# Patient Record
(demographics unavailable — no encounter records)

---

## 2024-10-14 NOTE — HISTORY OF PRESENT ILLNESS
[10] : 10 [8] : 8 [Sharp] : sharp [Frequent] : frequent [de-identified] : WC DOI 7/29/24.    . 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    10/2/24- here to follow up on neck/back pain only mildy improved with PT   9/4/24- MRI f/u.  Patient is a 56 year old male who presents today for evaluation of his neck/lower back. Pt states he fell and a bunch of boxed fell on top of him 07/29/24. Went to [facility], had XR taken which showed no fx. No previous injury. Went to  which gave him MDP and Flexeril  then sente for follow-up.  Has been OOW since injury. Has mayito taking muscle relaxants. MDP gave mild relief.     occupation -   hx of blood clots, saw hematologist without any underlying conditions , on Eliquis   hx of DM, metformin  [] : no [de-identified] : physical therapy

## 2024-10-14 NOTE — ASSESSMENT
[FreeTextEntry1] : 56 M with neck pain Left shoulde rpain and upper arm pain as well as lBP and spasm  We will also provide a prescription for anti-inflammatories.  Discussed major side effects of medication including but not limited to gastritis and acute kidney injury.  He was instructed to take with food and to discontinue use if stomach or esophageal pain developed. PT FOr C and L spine  OOW Pain managment referral for TITUS

## 2024-10-14 NOTE — DISCUSSION/SUMMARY
[Surgical risks reviewed] : Surgical risks reviewed [de-identified] : Pt present with wife as   Discussed surgical management options, Plan for left shoulder arthroscopy, rotator cuff repair, SAD, GHD, indicated procedures  Discussed rba, rehab, recovery Discussed risk related to surgery Pt wishes to proceed  ----------------------------------------------------------------------------  In light of the patient's medical co-morbidities we specifically discussed the negative impact of said conditions on wound healing, including wound dehiscence, wound drainage superficial and deep infection, and failure of surgical implants, biologic union, and negative patient outcomes. -----------------------------  The patient was advised of the diagnosis.  The natural history of the pathology was explained in full to the patient. All questions were answered.  The risks and benefits of surgical and non-surgical treatment were explained in full to the patient.  The patient demonstrated a full understanding of the surgical and non-surgical options.  The risks of surgery were outlined in full to the patient including but not limited to pain, stiffness, bleeding, scarring, infection, neurologic injury, vascular injury, failure to resolve symptoms, symptom recurrence, the need for further surgery, non-healing, implant failure, intraoperative fracture, wound breakdown, deep vein thrombosis, pulmonary embolism, anesthesia complications and even death.  The patient understood all the risks and accepted them and understood that other complications could occur that are not mentioned above.  The intraoperative plan, post-operative plan, post-operative expectations and limitations were explained in full.  Importance of postoperative rehabilitation and restriction compliance was explained and emphasized. Expectations from non-surgical treatment were explained in full as well.  The patient demonstrated a complete understanding of the treatment detailed, the risks and alternatives.  ----------------------------------------------------------------------------   All relevant imaging studies pertinent to today's visit, including x-rays, MRI's and/or other advanced imaging studies (CT/etc) were independently interpreted and reviewed with the patient as needed. Implications of the studies together with the patient's clinical picture were discussed to formulate a working diagnosis and management options were detailed.   The patient and/or guardian was advised of the diagnosis.  The natural history of the pathology was explained in full. All questions were answered.  The risks and benefits of conservative and interventional treatment alternatives were explained to the patient  The patient and/or guardian was advised if any advanced diagnostic/imaging study (MRI/CT/etc) is ordered to evaluate potential pathology in the affected area(s), they should follow up in the office to review the results of the study and determine further management that may be indicated.

## 2024-10-14 NOTE — IMAGING
[No instability seen on flexion/extension] : No instability seen on flexion/extension [No bony abnormalities] : No bony abnormalities [Disc space narrowing] : Disc space narrowing [de-identified] : Spine: Inspection/Palpation: No tenderness to palpation throughout Cervical/thoracic/lumbar spine. No bony stepoffs, No lesions.   Gait: antalgic, able to perform bilateral toe and heel rise.    Neurologic: Bilateral upper extremities 5/5 Deltoid/Biceps/Triceps/ Wrist Flexion/Wrist Extension/ / Intrinsics Except Bilateral Lower Extremities 5/5 Iliopsoas/Quadriceps/Hamstrings/ Tibialis Anterior/ Gastrocnemius. Extensor Hallucis Longus/ Flexor Hallucis Longus except   Sensation intact to light touch C5-T1 Sensation intact to light touch L2-S1    Negative Dumont's,    Decreased Left shoulder ROM.  Pain with AROM adn PROM

## 2024-10-14 NOTE — DATA REVIEWED
[MRI] : MRI [Cervical Spine] : cervical spine [Lumbar Spine] : lumbar spine [I independently reviewed and interpreted images and report] : I independently reviewed and interpreted images and report [FreeTextEntry1] : mild degenerative changes. Noc kasey compression in c spine. L4-5 and L5-s1 HNP

## 2024-10-21 NOTE — HISTORY OF PRESENT ILLNESS
[FreeTextEntry1] : pt states he is having pain in the neck and lower back  lower back pain the pain radiates into the legs , numbness tingling  [] : Patient is currently injured and not playing sports: no [FreeTextEntry3] : 07/29/2024 [FreeTextEntry6] : numbness  [FreeTextEntry7] : b/l legs , right shoulder  [de-identified] : lifting anything

## 2024-10-21 NOTE — DISCUSSION/SUMMARY
[de-identified] : I personally reviewed the MRI/CT scan images and agree with the radiologist's report. The radiological findings were discussed with the patient  Patient is presenting with acute/sub-acute radicular pain with impairment in ADLs and functionality.  The pain has not responded to  conservative care including nsaid therapy and/or physical therapy.  There is no bleeding tendency, unstable medical condition, or systemic infection. The proposed procedure corresponds clinically to the dermatomal pattern of the affected nerve/nerves.  proceed with  lesi l4-5

## 2024-10-21 NOTE — PHYSICAL EXAM
[de-identified] : PHYSICAL EXAM  Constitutional:  Appears well, no apparent distress Ability to communicate: Normal Respiratory: non-labored breathing Skin: no rash noted Head: normocephalic, atraumatic Neck: no visible thyroid enlargement Eyes: extraocular movements intact Neurologic: alert and oriented x3 Psychiatric: normal mood, affect, and behavior  Lumbar Spine:  Palpation: right lumbar paraspinal spasm and right lumbar paraspinal tenderness to palpation. ROM: Diminished range of motion in all plains.  Patient notes pain with lateral bending to the right. MMT: Motor exam is 5/5 through out bilateral lower extremities. Sensation: Light touch and pain is intact throughout bilateral lower extremities. Reflexes: achilles and patella reflexes are intact and  symmetrical.  No sustained clonus. Special Testing: Positive straight leg raise on the right side.  Assessemnt: Radiculopathy of lumbosacral region (M54.17) Myalgia (M79.10)  Plan: After discussing various treatment options with the patient including but not limited to oral medications, physical therapy, exercise modalities as well as interventional spinal injections, we have decided with the following plan: The patient is presenting with acute/sub-acute radicular pain with impairment in ADLs and functionality.  The pain has not responded to conservative care including NSAID therapy and/or physical therapy.  There is no bleeding tendency, unstable medical condition, or systemic infection  The risks, benefits and alternatives of the proposed procedure were explained in detail with the patient.  The risks outlined include but are not limited to infection, bleeding, post dural puncture headache, nerve injury, a temporary increase in pain, failure to resolve symptoms, allergic reaction, symptom recurrence, and possible elevation of blood sugar.  All questions were answered to patient's satisfaction and he/she verbalized an understanding.  Follow up 1-2 weeks post injection foe re-evaluation.  Continue home exercises, stretching, activity modification, physical therapy, and conservative care.

## 2024-11-13 NOTE — HISTORY OF PRESENT ILLNESS
[10] : 10 [8] : 8 [Radiating] : radiating [Sharp] : sharp [Shooting] : shooting [Squeezing] : squeezing [Constant] : constant [Meds] : meds [Standing] : standing [Not working due to injury] : Work status: not working due to injury [de-identified] :  DOI 7/29/24.    . 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    10/2/24- here to follow up on neck/back pain only mildy improved with PT   9/4/24- MRI f/u.  Patient is a 56 year old male who presents today for evaluation of his neck/lower back. Pt states he fell and a bunch of boxed fell on top of him 07/29/24. Went to [facility], had XR taken which showed no fx. No previous injury. Went to  which gave him MDP and Flexeril  then sente for follow-up.  Has been OOW since injury. Has mayito taking muscle relaxants. MDP gave mild relief.    11/13/2024: here for wc f/u. pain remains the same. patient goes to PT 3x weekly which makes his pain worse but feels somewhat better afterwards. Pain is now radiating down to his right knee. Patient denies n/t.  Patient is diabetic - most recent A1c is 6.8 [] : Post Surgical Visit: no [FreeTextEntry7] : Right knee [de-identified] :

## 2024-11-13 NOTE — WORK
[Was the competent medical cause of the injury] : was the competent medical cause of the injury [Are consistent with the injury] : are consistent with the injury [Consistent with my objective findings] : consistent with my objective findings [Total (100%)] : total (100%)

## 2024-11-13 NOTE — IMAGING
[No instability seen on flexion/extension] : No instability seen on flexion/extension [No bony abnormalities] : No bony abnormalities [Disc space narrowing] : Disc space narrowing [de-identified] : Spine: Inspection/Palpation: No tenderness to palpation throughout Cervical/thoracic/lumbar spine. No bony stepoffs, No lesions.   Gait: antalgic, able to perform bilateral toe and heel rise.    Neurologic: Bilateral upper extremities 5/5 Deltoid/Biceps/Triceps/ Wrist Flexion/Wrist Extension/ / Intrinsics Except Bilateral Lower Extremities 5/5 Iliopsoas/Quadriceps/Hamstrings/ Tibialis Anterior/ Gastrocnemius. Extensor Hallucis Longus/ Flexor Hallucis Longus except   Sensation intact to light touch C5-T1 Sensation intact to light touch L2-S1    Negative Dumont's,    Decreased Left shoulder ROM.  Pain with AROM adn PROM

## 2025-01-15 NOTE — ASSESSMENT
[FreeTextEntry1] : 56 M with neck pain Left shoulder pain and upper arm pain as well as lBP and spasm. Had recent L shoulder surgery.  has aggravated his neck pain   We will also provide a prescription for anti-inflammatories.  Discussed major side effects of medication including but not limited to gastritis and acute kidney injury.  He was instructed to take with food and to discontinue use if stomach or esophageal pain developed. PT FOr C and L spine  OOW FU with pain management for possible TITUS

## 2025-01-15 NOTE — HISTORY OF PRESENT ILLNESS
[de-identified] :  DOI 7/29/24.    . 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    01/15/2025:  PT is here to F/U with neck/back pain.  10/2/24- here to follow up on neck/back pain only mildy improved with PT   9/4/24- MRI f/u.  Patient is a 56 year old male who presents today for evaluation of his neck/lower back. Pt states he fell and a bunch of boxed fell on top of him 07/29/24. Went to [facility], had XR taken which showed no fx. No previous injury. Went to  which gave him MDP and Flexeril  then sente for follow-up.  Has been OOW since injury. Has mayito taking muscle relaxants. MDP gave mild relief.    11/13/2024: here for wc f/u. pain remains the same. patient goes to PT 3x weekly which makes his pain worse but feels somewhat better afterwards. Pain is now radiating down to his right knee. Patient denies n/t.  Patient is diabetic - most recent A1c is 6.8 [] : Post Surgical Visit: no [FreeTextEntry7] : Right knee [de-identified] :

## 2025-01-15 NOTE — IMAGING
[de-identified] : Spine: Inspection/Palpation: No tenderness to palpation throughout Cervical/thoracic/lumbar spine. No bony stepoffs, No lesions.   Gait: antalgic, able to perform bilateral toe and heel rise.    Neurologic: Bilateral upper extremities 5/5 Deltoid/Biceps/Triceps/ Wrist Flexion/Wrist Extension/ / Intrinsics Except Bilateral Lower Extremities 5/5 Iliopsoas/Quadriceps/Hamstrings/ Tibialis Anterior/ Gastrocnemius. Extensor Hallucis Longus/ Flexor Hallucis Longus except   Sensation intact to light touch C5-T1 Sensation intact to light touch L2-S1    Negative Dumont's,    Decreased Left shoulder ROM.  Pain with AROM adn PROM

## 2025-01-21 NOTE — IMAGING
[No instability seen on flexion/extension] : No instability seen on flexion/extension [No bony abnormalities] : No bony abnormalities [Disc space narrowing] : Disc space narrowing [Left] : left shoulder [Type 3 acromion] : Type 3 acromion [AC Joint Arthrosis] : AC Joint Arthrosis [de-identified] : incision CDI mild effusion ROM : 100 ff 10 ER NVI  sutures removed, steri strips applied

## 2025-01-21 NOTE — DISCUSSION/SUMMARY
[de-identified] : PO course reviewed Continue PT per protocol continue sling ibuprofen 600mg BID   f/u 4 weeks ----------------------------------------------------------------------------   Patient warned of specific risks of medication related to bleeding, GI issues, increase blood pressure, and cardiac risks in addition to additional risks.  Patient advised to discuss with PMD  if any presence of stated issues.  ----------------------------------------------------------------------------    All relevant imaging studies pertinent to today's visit, including x-rays, MRI's and/or other advanced imaging studies (CT/etc) were independently interpreted and reviewed with the patient as needed. Implications of the studies together with the patient's clinical picture were discussed to formulate a working diagnosis and management options were detailed.   The patient and/or guardian was advised of the diagnosis. The natural history of the pathology was explained in full. All questions were answered. The risks and benefits of conservative and interventional treatment alternatives were explained to the patient  The patient and/or guardian was advised if any advanced diagnostic/imaging study (MRI/CT/etc) is ordered to evaluate potential pathology in the affected area(s), they should follow up in the office to review the results of the study and determine further management that may be indicated.

## 2025-01-21 NOTE — DATA REVIEWED
[MRI] : MRI [Left] : left [Shoulder] : shoulder [Cervical Spine] : cervical spine [Lumbar Spine] : lumbar spine [Report was reviewed and noted in the chart] : The report was reviewed and noted in the chart [I independently reviewed and interpreted images and report] : I independently reviewed and interpreted images and report [I reviewed the films/CD] : I reviewed the films/CD [FreeTextEntry1] : small high grade to full thickness rotator cuff tear involving the supraspinatus, superior labral fraying, GT cyst

## 2025-01-21 NOTE — HISTORY OF PRESENT ILLNESS
[10] : 10 [Radiating] : radiating [Shooting] : shooting [Squeezing] : squeezing [Constant] : constant [Standing] : standing [Work related] : work related [8] : 8 [Sharp] : sharp [Frequent] : frequent [Meds] : meds [Not working due to injury] : Work status: not working due to injury [de-identified] : wc doi: 7/29/24 This is Mr. BRYAN WATERMAN  a 56 year old male who comes in today complaining of left shoulder pain since getting hurt at work on 7/29/24.  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  He saw Dr Guillen and was sent for an MRI of his shoulder.  pain is lateral/ posterior. pain at night. pain with overhead activity/ lifting. taking mobic with temporary help.  no prior shoulder injury.  [] : no [FreeTextEntry3] : 7/29/24 [de-identified] : 1/7/25 [de-identified] : xray/MRI [de-identified] : physical therapy [de-identified] : 1/7/25

## 2025-01-21 NOTE — WORK
[Sprain/Strain] : sprain/strain [Torn Ligament/Tendon/Muscle] : torn ligament, tendon or muscle [Was the competent medical cause of the injury] : was the competent medical cause of the injury [Are consistent with the injury] : are consistent with the injury [Consistent with my objective findings] : consistent with my objective findings [Total (100%)] : total (100%) [Does not reveal pre-existing condition(s) that may affect treatment/prognosis] : does not reveal pre-existing condition(s) that may affect treatment/prognosis

## 2025-01-21 NOTE — HISTORY OF PRESENT ILLNESS
[10] : 10 [Radiating] : radiating [Shooting] : shooting [Squeezing] : squeezing [Constant] : constant [Standing] : standing [Work related] : work related [8] : 8 [Sharp] : sharp [Frequent] : frequent [Meds] : meds [Not working due to injury] : Work status: not working due to injury [de-identified] : wc doi: 7/29/24 This is Mr. BRYAN WATERMAN  a 56 year old male who comes in today complaining of left shoulder pain since getting hurt at work on 7/29/24.  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  He saw Dr Guillen and was sent for an MRI of his shoulder.  pain is lateral/ posterior. pain at night. pain with overhead activity/ lifting. taking mobic with temporary help.  no prior shoulder injury.  [] : no [FreeTextEntry3] : 7/29/24 [de-identified] : 1/7/25 [de-identified] : xray/MRI [de-identified] : physical therapy [de-identified] : 1/7/25

## 2025-01-21 NOTE — DISCUSSION/SUMMARY
[de-identified] : PO course reviewed Continue PT per protocol continue sling ibuprofen 600mg BID   f/u 4 weeks ----------------------------------------------------------------------------   Patient warned of specific risks of medication related to bleeding, GI issues, increase blood pressure, and cardiac risks in addition to additional risks.  Patient advised to discuss with PMD  if any presence of stated issues.  ----------------------------------------------------------------------------    All relevant imaging studies pertinent to today's visit, including x-rays, MRI's and/or other advanced imaging studies (CT/etc) were independently interpreted and reviewed with the patient as needed. Implications of the studies together with the patient's clinical picture were discussed to formulate a working diagnosis and management options were detailed.   The patient and/or guardian was advised of the diagnosis. The natural history of the pathology was explained in full. All questions were answered. The risks and benefits of conservative and interventional treatment alternatives were explained to the patient  The patient and/or guardian was advised if any advanced diagnostic/imaging study (MRI/CT/etc) is ordered to evaluate potential pathology in the affected area(s), they should follow up in the office to review the results of the study and determine further management that may be indicated.

## 2025-01-21 NOTE — IMAGING
[No instability seen on flexion/extension] : No instability seen on flexion/extension [No bony abnormalities] : No bony abnormalities [Disc space narrowing] : Disc space narrowing [Left] : left shoulder [Type 3 acromion] : Type 3 acromion [AC Joint Arthrosis] : AC Joint Arthrosis [de-identified] : incision CDI mild effusion ROM : 100 ff 10 ER NVI  sutures removed, steri strips applied

## 2025-01-21 NOTE — REASON FOR VISIT
[FreeTextEntry2] : 1st post op left shoulder. doing well. managing pain with meds, ran out of pain meds. no F/C dos: 1/7/25

## 2025-02-13 NOTE — HISTORY OF PRESENT ILLNESS
[Work related] : work related [8] : 8 [Sharp] : sharp [Frequent] : frequent [Meds] : meds [Not working due to injury] : Work status: not working due to injury [de-identified] : wc doi: 7/29/24 This is Mr. BRYAN WATERMAN  a 56 year old male who comes in today complaining of left shoulder pain since getting hurt at work on 7/29/24.  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  He saw Dr Guillen and was sent for an MRI of his shoulder.  pain is lateral/ posterior. pain at night. pain with overhead activity/ lifting. taking mobic with temporary help.  no prior shoulder injury.  [] : no [FreeTextEntry3] : 7/29/24 [de-identified] : 1/7/25 [de-identified] : xray/MRI [de-identified] : physical therapy [de-identified] : 1/7/25

## 2025-02-13 NOTE — DISCUSSION/SUMMARY
[de-identified] : Discussed proper post-op protocol and activity modifications Continue PT per protocol d/c sling remain OOW f/u 4 weeks will plan for MDP in 4 weeks  ----------------------------------------------------------------------------    All relevant imaging studies pertinent to today's visit, including x-rays, MRI's and/or other advanced imaging studies (CT/etc) were independently interpreted and reviewed with the patient as needed. Implications of the studies together with the patient's clinical picture were discussed to formulate a working diagnosis and management options were detailed.   The patient and/or guardian was advised of the diagnosis. The natural history of the pathology was explained in full. All questions were answered. The risks and benefits of conservative and interventional treatment alternatives were explained to the patient  The patient and/or guardian was advised if any advanced diagnostic/imaging study (MRI/CT/etc) is ordered to evaluate potential pathology in the affected area(s), they should follow up in the office to review the results of the study and determine further management that may be indicated.

## 2025-02-13 NOTE — IMAGING
[No instability seen on flexion/extension] : No instability seen on flexion/extension [No bony abnormalities] : No bony abnormalities [Disc space narrowing] : Disc space narrowing [Left] : left shoulder [Type 3 acromion] : Type 3 acromion [AC Joint Arthrosis] : AC Joint Arthrosis [de-identified] : incision CDI mild effusion ROM : 90ff 0 ER NVI

## 2025-02-13 NOTE — IMAGING
[No instability seen on flexion/extension] : No instability seen on flexion/extension [No bony abnormalities] : No bony abnormalities [Disc space narrowing] : Disc space narrowing [Left] : left shoulder [Type 3 acromion] : Type 3 acromion [AC Joint Arthrosis] : AC Joint Arthrosis [de-identified] : incision CDI mild effusion ROM : 90ff 0 ER NVI

## 2025-02-13 NOTE — HISTORY OF PRESENT ILLNESS
[Work related] : work related [8] : 8 [Sharp] : sharp [Frequent] : frequent [Meds] : meds [Not working due to injury] : Work status: not working due to injury [de-identified] : wc doi: 7/29/24 This is Mr. BRYAN WATERMAN  a 56 year old male who comes in today complaining of left shoulder pain since getting hurt at work on 7/29/24.  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  He saw Dr Guillen and was sent for an MRI of his shoulder.  pain is lateral/ posterior. pain at night. pain with overhead activity/ lifting. taking mobic with temporary help.  no prior shoulder injury.  [] : no [FreeTextEntry3] : 7/29/24 [de-identified] : 1/7/25 [de-identified] : xray/MRI [de-identified] : physical therapy [de-identified] : 1/7/25

## 2025-02-13 NOTE — DISCUSSION/SUMMARY
[de-identified] : Discussed proper post-op protocol and activity modifications Continue PT per protocol d/c sling remain OOW f/u 4 weeks will plan for MDP in 4 weeks  ----------------------------------------------------------------------------    All relevant imaging studies pertinent to today's visit, including x-rays, MRI's and/or other advanced imaging studies (CT/etc) were independently interpreted and reviewed with the patient as needed. Implications of the studies together with the patient's clinical picture were discussed to formulate a working diagnosis and management options were detailed.   The patient and/or guardian was advised of the diagnosis. The natural history of the pathology was explained in full. All questions were answered. The risks and benefits of conservative and interventional treatment alternatives were explained to the patient  The patient and/or guardian was advised if any advanced diagnostic/imaging study (MRI/CT/etc) is ordered to evaluate potential pathology in the affected area(s), they should follow up in the office to review the results of the study and determine further management that may be indicated.

## 2025-03-12 NOTE — ASSESSMENT
[FreeTextEntry1] : 56 M with neck pain Left shoulder pain and upper arm pain as well as lBP and spasm. Had recent L shoulder surgery.  has aggravated his neck pain   We will also provide a prescription for anti-inflammatories.  Discussed major side effects of medication including but not limited to gastritis and acute kidney injury.  He was instructed to take with food and to discontinue use if stomach or esophageal pain developed. PT FOr C spine  OOW FU with pain management for possible TITUS

## 2025-03-12 NOTE — IMAGING
[No instability seen on flexion/extension] : No instability seen on flexion/extension [No bony abnormalities] : No bony abnormalities [Disc space narrowing] : Disc space narrowing [de-identified] : Spine: Inspection/Palpation: No tenderness to palpation throughout Cervical/thoracic/lumbar spine. No bony stepoffs, No lesions.   Gait: antalgic, able to perform bilateral toe and heel rise.    Neurologic: Bilateral upper extremities 5/5 Deltoid/Biceps/Triceps/ Wrist Flexion/Wrist Extension/ / Intrinsics Except Bilateral Lower Extremities 5/5 Iliopsoas/Quadriceps/Hamstrings/ Tibialis Anterior/ Gastrocnemius. Extensor Hallucis Longus/ Flexor Hallucis Longus except   Sensation intact to light touch C5-T1 Sensation intact to light touch L2-S1    Negative Dumont's,    Decreased Left shoulder ROM.  Pain with AROM adn PROM

## 2025-03-12 NOTE — HISTORY OF PRESENT ILLNESS
[10] : 10 [8] : 8 [Radiating] : radiating [Sharp] : sharp [Shooting] : shooting [Squeezing] : squeezing [Constant] : constant [Meds] : meds [Standing] : standing [Not working due to injury] : Work status: not working due to injury [de-identified] :  DOI 7/29/24.    . 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    03/12/2025:  PT is here to F/U with neck and back pain. He states that he has been feeling the same since last visit.  01/15/2025:  PT is here to F/U with neck/back pain.  10/2/24- here to follow up on neck/back pain only mildy improved with PT   9/4/24- MRI f/u.  Patient is a 56 year old male who presents today for evaluation of his neck/lower back. Pt states he fell and a bunch of boxed fell on top of him 07/29/24. Went to [facility], had XR taken which showed no fx. No previous injury. Went to  which gave him MDP and Flexeril  then sente for follow-up.  Has been OOW since injury. Has mayito taking muscle relaxants. MDP gave mild relief.    11/13/2024: here for wc f/u. pain remains the same. patient goes to PT 3x weekly which makes his pain worse but feels somewhat better afterwards. Pain is now radiating down to his right knee. Patient denies n/t.  Patient is diabetic - most recent A1c is 6.8 [] : Post Surgical Visit: no [FreeTextEntry7] : Right knee [de-identified] :

## 2025-03-13 NOTE — REASON FOR VISIT
[FreeTextEntry2] : 3rd post op left shoulder. doing well w/ pain but still experiencing pain at night. needs to discuss PT. tingling in fingers. A1c: 6.8 dos: 1/7/25

## 2025-03-13 NOTE — DISCUSSION/SUMMARY
[Medication Risks Reviewed] : Medication risks reviewed [de-identified] : PLEASE LET THIS SERVE AS A LETTER OF MEDICAL NECESSITY FOR ADDITIONAL PHYSICAL THERAPY GIVEN PATIENT SURGERY INCLUDING ROTATOR CUFF REPAIR AND MANIPULATION AND LYSIS OF ADHESIONS, STANDARD OF CARE INCLUDES 5-6 MONTHS OF PHYSICAL THERAPY POST-SURGERY. PATIENT REMAINS STIFF ON THE LEFT SHOULDER Discussed proper post-op protocol and activity modifications Continue PT per protocol Rx: MDP Rx:Diclofenac ----------------------------------------------------------------------------    All relevant imaging studies pertinent to today's visit, including x-rays, MRI's and/or other advanced imaging studies (CT/etc) were independently interpreted and reviewed with the patient as needed. Implications of the studies together with the patient's clinical picture were discussed to formulate a working diagnosis and management options were detailed.   The patient and/or guardian was advised of the diagnosis. The natural history of the pathology was explained in full. All questions were answered. The risks and benefits of conservative and interventional treatment alternatives were explained to the patient  The patient and/or guardian was advised if any advanced diagnostic/imaging study (MRI/CT/etc) is ordered to evaluate potential pathology in the affected area(s), they should follow up in the office to review the results of the study and determine further management that may be indicated.     ----------------------------------------------------------------------------   Patient warned of specific risks of medication related to bleeding, GI issues, increase blood pressure, and cardiac risks in addition to additional risks.  Patient advised to discuss with PMD  if any presence of stated issues.

## 2025-03-13 NOTE — HISTORY OF PRESENT ILLNESS
[Work related] : work related [8] : 8 [Sharp] : sharp [Frequent] : frequent [Meds] : meds [Not working due to injury] : Work status: not working due to injury [de-identified] : wc doi: 7/29/24 This is Mr. BRYAN WATERMAN  a 56 year old male who comes in today complaining of left shoulder pain since getting hurt at work on 7/29/24.  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  He saw Dr Guillen and was sent for an MRI of his shoulder.  pain is lateral/ posterior. pain at night. pain with overhead activity/ lifting. taking mobic with temporary help.  no prior shoulder injury.   hx of DM- last A1c 8.6   s/p L shoulder arthroscopy, RCR, SULEIMAN, LO, Gh debridement, SAD, and total bursectomy (DOS:1/7/25) [] : no [FreeTextEntry3] : 7/29/24 [de-identified] : 1/7/25 [de-identified] : xray/MRI [de-identified] : physical therapy [de-identified] : 1/7/25

## 2025-03-13 NOTE — IMAGING
[No instability seen on flexion/extension] : No instability seen on flexion/extension [No bony abnormalities] : No bony abnormalities [Disc space narrowing] : Disc space narrowing [Left] : left shoulder [Type 3 acromion] : Type 3 acromion [AC Joint Arthrosis] : AC Joint Arthrosis [de-identified] : incision CDI mild effusion ROM : 90ff ap 0 ER NVI

## 2025-04-24 NOTE — REASON FOR VISIT
[FreeTextEntry2] : follow up left shoulder- still having stiffness with shoulder ROM family accompanied him for translation dos: 1/7/25

## 2025-04-24 NOTE — DISCUSSION/SUMMARY
[Medication Risks Reviewed] : Medication risks reviewed [de-identified] : Plan for continued PT to work on ROM Plan for L shoulder IAC/SAC CSI inj today Discussed the importance of proper glycemic control and maintenance  Discussed if he becomes significantly stiff would not consider return for SULEIMAN until diabetes well managed ----------------------------------------------------------------------------  Patient was advised that steroid medications may result in increased blood sugars, and given patient's history of diabetes, blood sugars should be monitored closely, where applicable, for 5-10 days during and following steroid medication intake orally or through injection.   ----------------------------------------------------------------------------    All relevant imaging studies pertinent to today's visit, including x-rays, MRI's and/or other advanced imaging studies (CT/etc) were independently interpreted and reviewed with the patient as needed. Implications of the studies together with the patient's clinical picture were discussed to formulate a working diagnosis and management options were detailed.   The patient and/or guardian was advised of the diagnosis. The natural history of the pathology was explained in full. All questions were answered. The risks and benefits of conservative and interventional treatment alternatives were explained to the patient  The patient and/or guardian was advised if any advanced diagnostic/imaging study (MRI/CT/etc) is ordered to evaluate potential pathology in the affected area(s), they should follow up in the office to review the results of the study and determine further management that may be indicated.     ----------------------------------------------------------------------------  Large joint corticosteroid injection given: Left shoulder SAC/IAC  Patient indicated for injection after trial of rest, OTC medications including aspirin, Ibuprofen, Aleve etc or prescription NSAIDS, and/or exercises at home and/ or physical therapy without satisfactory response.  Patient has symptoms including pain, swelling, and/or decreased mobility in the joint. The risks, benefits, and alternatives to corticosteroid injection were explained in full to the patient, including but not limited to infection, sepsis, bleeding, scarring, skin discoloration, temporary increase in pain, syncopal episode, failure to resolve symptoms, allergic reaction, symptom recurrence, and elevation of blood sugar in diabetics. Patient understood the risks. All questions were answered. After discussion of options, patient requested an injection.   Oral informed consent was obtained and sterile technique was utilized for the procedure including the preparation of the solutions used for the injection and betadine followed by alcohol prep to the injection site. Anesthesia was given with ethyl chloride sprayed topically. The injection was delivered. Patient tolerated the procedure well.   Post Procedure Instructions: Patient was advised to call if redness, pain, or fever occur and apply ice for 15 min on and 15 min off later today  Medications delivered: Kenalog: 10 mg, Lidocaine: 4 cc, Marcaine: 4 cc.

## 2025-04-24 NOTE — IMAGING
[No instability seen on flexion/extension] : No instability seen on flexion/extension [No bony abnormalities] : No bony abnormalities [Disc space narrowing] : Disc space narrowing [Left] : left shoulder [Type 3 acromion] : Type 3 acromion [AC Joint Arthrosis] : AC Joint Arthrosis [de-identified] : incision CDI mild effusion ROM : 90ff ap 0 ER NVI

## 2025-04-24 NOTE — HISTORY OF PRESENT ILLNESS
[Work related] : work related [7] : 7 [Sharp] : sharp [Frequent] : frequent [Meds] : meds [Not working due to injury] : Work status: not working due to injury [de-identified] : wc doi: 7/29/24 This is Mr. BRYAN WATERMAN  a 56 year old male who comes in today complaining of left shoulder pain since getting hurt at work on 7/29/24.  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  He saw Dr Guillen and was sent for an MRI of his shoulder.  pain is lateral/ posterior. pain at night. pain with overhead activity/ lifting. taking mobic with temporary help.  no prior shoulder injury.   hx of DM- last A1c 7.8   s/p L shoulder arthroscopy, RCR, SULEIMAN, LO, Gh debridement, SAD, and total bursectomy (DOS:1/7/25) [] : no [FreeTextEntry3] : 7/29/24 [de-identified] : 1/7/25 [de-identified] : xray/MRI [de-identified] : physical therapy [de-identified] : 1/7/25

## 2025-04-24 NOTE — IMAGING
[No instability seen on flexion/extension] : No instability seen on flexion/extension [No bony abnormalities] : No bony abnormalities [Disc space narrowing] : Disc space narrowing [Left] : left shoulder [Type 3 acromion] : Type 3 acromion [AC Joint Arthrosis] : AC Joint Arthrosis [de-identified] : incision CDI mild effusion ROM : 90ff ap 0 ER NVI

## 2025-04-24 NOTE — DISCUSSION/SUMMARY
[Medication Risks Reviewed] : Medication risks reviewed [de-identified] : Plan for continued PT to work on ROM Plan for L shoulder IAC/SAC CSI inj today Discussed the importance of proper glycemic control and maintenance  Discussed if he becomes significantly stiff would not consider return for SULEIMAN until diabetes well managed ----------------------------------------------------------------------------  Patient was advised that steroid medications may result in increased blood sugars, and given patient's history of diabetes, blood sugars should be monitored closely, where applicable, for 5-10 days during and following steroid medication intake orally or through injection.   ----------------------------------------------------------------------------    All relevant imaging studies pertinent to today's visit, including x-rays, MRI's and/or other advanced imaging studies (CT/etc) were independently interpreted and reviewed with the patient as needed. Implications of the studies together with the patient's clinical picture were discussed to formulate a working diagnosis and management options were detailed.   The patient and/or guardian was advised of the diagnosis. The natural history of the pathology was explained in full. All questions were answered. The risks and benefits of conservative and interventional treatment alternatives were explained to the patient  The patient and/or guardian was advised if any advanced diagnostic/imaging study (MRI/CT/etc) is ordered to evaluate potential pathology in the affected area(s), they should follow up in the office to review the results of the study and determine further management that may be indicated.     ----------------------------------------------------------------------------  Large joint corticosteroid injection given: Left shoulder SAC/IAC  Patient indicated for injection after trial of rest, OTC medications including aspirin, Ibuprofen, Aleve etc or prescription NSAIDS, and/or exercises at home and/ or physical therapy without satisfactory response.  Patient has symptoms including pain, swelling, and/or decreased mobility in the joint. The risks, benefits, and alternatives to corticosteroid injection were explained in full to the patient, including but not limited to infection, sepsis, bleeding, scarring, skin discoloration, temporary increase in pain, syncopal episode, failure to resolve symptoms, allergic reaction, symptom recurrence, and elevation of blood sugar in diabetics. Patient understood the risks. All questions were answered. After discussion of options, patient requested an injection.   Oral informed consent was obtained and sterile technique was utilized for the procedure including the preparation of the solutions used for the injection and betadine followed by alcohol prep to the injection site. Anesthesia was given with ethyl chloride sprayed topically. The injection was delivered. Patient tolerated the procedure well.   Post Procedure Instructions: Patient was advised to call if redness, pain, or fever occur and apply ice for 15 min on and 15 min off later today  Medications delivered: Kenalog: 10 mg, Lidocaine: 4 cc, Marcaine: 4 cc.

## 2025-04-24 NOTE — HISTORY OF PRESENT ILLNESS
[Work related] : work related [7] : 7 [Sharp] : sharp [Frequent] : frequent [Meds] : meds [Not working due to injury] : Work status: not working due to injury [de-identified] : wc doi: 7/29/24 This is Mr. BRYAN WATERMAN  a 56 year old male who comes in today complaining of left shoulder pain since getting hurt at work on 7/29/24.  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  He saw Dr Guillen and was sent for an MRI of his shoulder.  pain is lateral/ posterior. pain at night. pain with overhead activity/ lifting. taking mobic with temporary help.  no prior shoulder injury.   hx of DM- last A1c 7.8   s/p L shoulder arthroscopy, RCR, SULEIMAN, LO, Gh debridement, SAD, and total bursectomy (DOS:1/7/25) [] : no [FreeTextEntry3] : 7/29/24 [de-identified] : 1/7/25 [de-identified] : xray/MRI [de-identified] : physical therapy [de-identified] : 1/7/25

## 2025-05-14 NOTE — HISTORY OF PRESENT ILLNESS
[10] : 10 [8] : 8 [Radiating] : radiating [Sharp] : sharp [Shooting] : shooting [Squeezing] : squeezing [Constant] : constant [Meds] : meds [Standing] : standing [Not working due to injury] : Work status: not working due to injury [de-identified] : WC DOI 7/29/24.    . 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    05/14/2025: PT is here to F/U with neck and back pain. Pt states pain is the same since last visit.  03/12/2025:  PT is here to F/U with neck and back pain. He states that he has been feeling the same since last visit.  01/15/2025:  PT is here to F/U with neck/back pain.  10/2/24- here to follow up on neck/back pain only mildy improved with PT   9/4/24- MRI f/u.  Patient is a 56 year old male who presents today for evaluation of his neck/lower back. Pt states he fell and a bunch of boxed fell on top of him 07/29/24. Went to [facility], had XR taken which showed no fx. No previous injury. Went to  which gave him MDP and Flexeril  then sente for follow-up.  Has been OOW since injury. Has mayito taking muscle relaxants. MDP gave mild relief.    11/13/2024: here for wc f/u. pain remains the same. patient goes to PT 3x weekly which makes his pain worse but feels somewhat better afterwards. Pain is now radiating down to his right knee. Patient denies n/t.  Patient is diabetic - most recent A1c is 6.8 [] : Post Surgical Visit: no [FreeTextEntry7] : Right knee [de-identified] :

## 2025-05-14 NOTE — ASSESSMENT
[FreeTextEntry1] : 56 M with neck pain Left shoulder pain and upper arm pain as well as lBP and spasm. Had recent L shoulder surgery.  has aggravated his neck pain   We will also provide a prescription for anti-inflammatories.  Discussed major side effects of medication including but not limited to gastritis and acute kidney injury.  He was instructed to take with food and to discontinue use if stomach or esophageal pain developed. PT FOr C spine  and trial of acupuncture  OOW FU with pain management for possible TITUS awaiting auth

## 2025-05-14 NOTE — IMAGING
[No instability seen on flexion/extension] : No instability seen on flexion/extension [No bony abnormalities] : No bony abnormalities [Disc space narrowing] : Disc space narrowing [de-identified] : Spine: Inspection/Palpation: No tenderness to palpation throughout Cervical/thoracic/lumbar spine. No bony stepoffs, No lesions.   Gait: antalgic, able to perform bilateral toe and heel rise.    Neurologic: Bilateral upper extremities 5/5 Deltoid/Biceps/Triceps/ Wrist Flexion/Wrist Extension/ / Intrinsics Except Bilateral Lower Extremities 5/5 Iliopsoas/Quadriceps/Hamstrings/ Tibialis Anterior/ Gastrocnemius. Extensor Hallucis Longus/ Flexor Hallucis Longus except   Sensation intact to light touch C5-T1 Sensation intact to light touch L2-S1    Negative Dumont's,    Decreased Left shoulder ROM.  Pain with AROM adn PROM

## 2025-06-19 NOTE — HISTORY OF PRESENT ILLNESS
[Work related] : work related [6] : 6 [Sharp] : sharp [Frequent] : frequent [Meds] : meds [Not working due to injury] : Work status: not working due to injury [de-identified] : wc doi: 7/29/24 This is Mr. BRYAN WATERMAN  a 56 year old male who comes in today complaining of left shoulder pain since getting hurt at work on 7/29/24.  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  He saw Dr Guillen and was sent for an MRI of his shoulder.  pain is lateral/ posterior. pain at night. pain with overhead activity/ lifting. taking mobic with temporary help.  no prior shoulder injury.   hx of DM- last A1c 7.8   s/p L shoulder arthroscopy, RCR, SULEIMAN, LO, Gh debridement, SAD, and total bursectomy (DOS:1/7/25) [] : no [FreeTextEntry3] : 7/29/24 [de-identified] : 1/7/25 [de-identified] : xray/MRI [de-identified] : none [de-identified] : 1/7/25

## 2025-06-19 NOTE — IMAGING
[No instability seen on flexion/extension] : No instability seen on flexion/extension [No bony abnormalities] : No bony abnormalities [Disc space narrowing] : Disc space narrowing [Left] : left shoulder [Type 3 acromion] : Type 3 acromion [AC Joint Arthrosis] : AC Joint Arthrosis [de-identified] : incision CDI mild effusion ROM : 95ff a 105p  5 ER NVI

## 2025-06-19 NOTE — DISCUSSION/SUMMARY
[Medication Risks Reviewed] : Medication risks reviewed [de-identified] : Plan for continued PT to work on ROM Discussed the importance of proper glycemic control and maintenance  If stiffness persists, consider SULEIMAN  fu 6 wks   ----------------------------------------------------------------------------    All relevant imaging studies pertinent to today's visit, including x-rays, MRI's and/or other advanced imaging studies (CT/etc) were independently interpreted and reviewed with the patient as needed. Implications of the studies together with the patient's clinical picture were discussed to formulate a working diagnosis and management options were detailed.   The patient and/or guardian was advised of the diagnosis. The natural history of the pathology was explained in full. All questions were answered. The risks and benefits of conservative and interventional treatment alternatives were explained to the patient  The patient and/or guardian was advised if any advanced diagnostic/imaging study (MRI/CT/etc) is ordered to evaluate potential pathology in the affected area(s), they should follow up in the office to review the results of the study and determine further management that may be indicated.

## 2025-06-19 NOTE — IMAGING
[No instability seen on flexion/extension] : No instability seen on flexion/extension [No bony abnormalities] : No bony abnormalities [Disc space narrowing] : Disc space narrowing [Left] : left shoulder [Type 3 acromion] : Type 3 acromion [AC Joint Arthrosis] : AC Joint Arthrosis [de-identified] : incision CDI mild effusion ROM : 95ff a 105p  5 ER NVI

## 2025-06-19 NOTE — HISTORY OF PRESENT ILLNESS
[Work related] : work related [6] : 6 [Sharp] : sharp [Frequent] : frequent [Meds] : meds [Not working due to injury] : Work status: not working due to injury [de-identified] : wc doi: 7/29/24 This is Mr. BRYAN WATERMAN  a 56 year old male who comes in today complaining of left shoulder pain since getting hurt at work on 7/29/24.  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  He saw Dr Guillen and was sent for an MRI of his shoulder.  pain is lateral/ posterior. pain at night. pain with overhead activity/ lifting. taking mobic with temporary help.  no prior shoulder injury.   hx of DM- last A1c 7.8   s/p L shoulder arthroscopy, RCR, SULEIMAN, LO, Gh debridement, SAD, and total bursectomy (DOS:1/7/25) [] : no [FreeTextEntry3] : 7/29/24 [de-identified] : 1/7/25 [de-identified] : xray/MRI [de-identified] : none [de-identified] : 1/7/25

## 2025-06-19 NOTE — REASON FOR VISIT
[FreeTextEntry2] : follow up left shoulder. improved with csi and PT.  A1c has improved to 6.8 last month  family accompanied him for translation dos: 1/7/25

## 2025-06-19 NOTE — DISCUSSION/SUMMARY
[Medication Risks Reviewed] : Medication risks reviewed [de-identified] : Plan for continued PT to work on ROM Discussed the importance of proper glycemic control and maintenance  If stiffness persists, consider SULEIMAN  fu 6 wks   ----------------------------------------------------------------------------    All relevant imaging studies pertinent to today's visit, including x-rays, MRI's and/or other advanced imaging studies (CT/etc) were independently interpreted and reviewed with the patient as needed. Implications of the studies together with the patient's clinical picture were discussed to formulate a working diagnosis and management options were detailed.   The patient and/or guardian was advised of the diagnosis. The natural history of the pathology was explained in full. All questions were answered. The risks and benefits of conservative and interventional treatment alternatives were explained to the patient  The patient and/or guardian was advised if any advanced diagnostic/imaging study (MRI/CT/etc) is ordered to evaluate potential pathology in the affected area(s), they should follow up in the office to review the results of the study and determine further management that may be indicated.

## 2025-07-18 NOTE — IMAGING
[No instability seen on flexion/extension] : No instability seen on flexion/extension [No bony abnormalities] : No bony abnormalities [Disc space narrowing] : Disc space narrowing [de-identified] : Spine: Inspection/Palpation: No tenderness to palpation throughout Cervical/thoracic/lumbar spine. No bony stepoffs, No lesions.   Gait: antalgic, able to perform bilateral toe and heel rise.    Neurologic: Bilateral upper extremities 5/5 Deltoid/Biceps/Triceps/ Wrist Flexion/Wrist Extension/ / Intrinsics Except Bilateral Lower Extremities 5/5 Iliopsoas/Quadriceps/Hamstrings/ Tibialis Anterior/ Gastrocnemius. Extensor Hallucis Longus/ Flexor Hallucis Longus except   Sensation intact to light touch C5-T1 Sensation intact to light touch L2-S1    Negative Dumont's,    Decreased Left shoulder ROM.  Pain with AROM adn PROM

## 2025-07-18 NOTE — HISTORY OF PRESENT ILLNESS
[10] : 10 [8] : 8 [Radiating] : radiating [Sharp] : sharp [Shooting] : shooting [Squeezing] : squeezing [Constant] : constant [Meds] : meds [Standing] : standing [Not working due to injury] : Work status: not working due to injury [de-identified] :  DOI 7/29/24.    . 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    7/18/25: WC f/u visit. OOW. WC denied PT. Spoke with pain management- awaiting approval.  PT adn Jenny denied  05/14/2025: PT is here to F/U with neck and back pain. Pt states pain is the same since last visit.  03/12/2025:  PT is here to F/U with neck and back pain. He states that he has been feeling the same since last visit.  01/15/2025:  PT is here to F/U with neck/back pain.  10/2/24- here to follow up on neck/back pain only mildy improved with PT   9/4/24- MRI f/u.  Patient is a 56 year old male who presents today for evaluation of his neck/lower back. Pt states he fell and a bunch of boxed fell on top of him 07/29/24. Went to [facility], had XR taken which showed no fx. No previous injury. Went to  which gave him MDP and Flexeril  then sente for follow-up.  Has been OOW since injury. Has mayito taking muscle relaxants. MDP gave mild relief.    11/13/2024: here for wc f/u. pain remains the same. patient goes to PT 3x weekly which makes his pain worse but feels somewhat better afterwards. Pain is now radiating down to his right knee. Patient denies n/t.  Patient is diabetic - most recent A1c is 6.8 [] : Post Surgical Visit: no [FreeTextEntry7] : Right knee [de-identified] :
